# Patient Record
Sex: FEMALE | Race: OTHER | Employment: FULL TIME | URBAN - METROPOLITAN AREA
[De-identification: names, ages, dates, MRNs, and addresses within clinical notes are randomized per-mention and may not be internally consistent; named-entity substitution may affect disease eponyms.]

---

## 2023-01-12 ENCOUNTER — OFFICE VISIT (OUTPATIENT)
Dept: PODIATRY | Age: 33
End: 2023-01-12
Payer: COMMERCIAL

## 2023-01-12 VITALS — BODY MASS INDEX: 21.18 KG/M2 | HEIGHT: 69 IN | WEIGHT: 143 LBS

## 2023-01-12 DIAGNOSIS — D23.72 BENIGN NEOPLASM OF SKIN OF LOWER LIMB, INCLUDING HIP, LEFT: Primary | ICD-10-CM

## 2023-01-12 DIAGNOSIS — M79.672 LEFT FOOT PAIN: ICD-10-CM

## 2023-01-12 DIAGNOSIS — M20.5X2 ACQUIRED DIGITI QUINTI VARUS DEFORMITY OF LEFT FOOT: ICD-10-CM

## 2023-01-12 PROCEDURE — 17110 DESTRUCTION B9 LES UP TO 14: CPT | Performed by: PODIATRIST

## 2023-01-12 PROCEDURE — 99204 OFFICE O/P NEW MOD 45 MIN: CPT | Performed by: PODIATRIST

## 2023-01-12 RX ORDER — NORETHINDRONE ACETATE AND ETHINYL ESTRADIOL AND FERROUS FUMARATE 1MG-20(21)
KIT ORAL
COMMUNITY
Start: 2023-01-09

## 2023-01-12 RX ORDER — AMITRIPTYLINE HYDROCHLORIDE 10 MG/1
TABLET, FILM COATED ORAL NIGHTLY
COMMUNITY

## 2023-01-12 NOTE — PROGRESS NOTES
[address] [facility],[address] PODIATRY [facility]  [address]  [address]  Dept: [phone]  Dept Fax: [phone]    NEW PATIENT PROGRESS NOTE  Date of patient's visit: 1/12/2023  Patient's Name:  Denisa Uribe YOB: 1990            Patient Care Team:  Tylor Munguia as PCP - General (Nurse Practitioner)        Chief Complaint   Patient presents with    New Patient     To establish care    Toe Pain     Left 4th toe x1 year         HPI:   Denisa Uribe is a 34 y.o. female who presents to the office today complaining of left 4th toe pain. Symptoms began 1 year(s) ago. Patient relates pain is Present. Pain is rated 1 out of 10 and is described as intermittent, mild. Treatments prior to today's visit include: none today . Currently denies F/C/N/V. Pt's primary care physician is Sherrie Rolon last seen      Allergies   Allergen Reactions    Ciprofloxacin Hcl      Other reaction(s): muscle cramps    Amoxicillin Swelling       No past medical history on file. Prior to Admission medications    Medication Sig Start Date End Date Taking? Authorizing Provider   amitriptyline (ELAVIL) 10 MG tablet Take by mouth nightly   Yes Historical Provider, MD Malachi Angel FE 1/20 1-20 MG-MCG per tablet  1/9/23  Yes Historical Provider, MD   medroxyPROGESTERone (DEPO-PROVERA) 150 MG/ML injection Inject 150 mg into the muscle  Patient not taking: Reported on 1/12/2023    Historical Provider, MD       Past Surgical History:   Procedure Laterality Date    BREAST SURGERY      Breast Augmentation       Family History   Problem Relation Age of Onset    Heart Disease Father     Heart Disease Sister     Other Brother         suicide       Social History     Tobacco Use    Smoking status: Never    Smokeless tobacco: Not on file   Substance Use Topics    Alcohol use:  Yes     Alcohol/week: 1.0 standard drink     Types: 1 Standard drinks or equivalent per week Comment: occasional       Review of Systems    Review of Systems:   History obtained from chart review and the patient  General ROS: negative for - chills, fatigue, fever, night sweats or weight gain  Constitutional: Negative for chills, diaphoresis, fatigue, fever and unexpected weight change. Musculoskeletal: Positive for arthralgias, gait problem and joint swelling. Neurological ROS: negative for - behavioral changes, confusion, headaches or seizures. Negative for weakness and numbness. Dermatological ROS: negative for - mole changes, rash  Cardiovascular: Negative for leg swelling. Gastrointestinal: Negative for constipation, diarrhea, nausea and vomiting. Lower Extremity Physical Examination:   Vitals: There were no vitals filed for this visit. Dermatologic Exam:  Skin lesion present to the  left plantar foot with a central core and petchaie noted to the lesions periphery. Pain on palpation of the lesion      Musculoskeletal:     1st MPJ ROM decreased, Bilateral.  Muscle strength 5/5, Bilateral.  Pain present upon palpation of left 4th toe laterally as it abuts the 5th toe. .  Medial longitudinal arch, Bilateral WNL.   Ankle ROM WNL,Bilateral.    Dorsally contracted digits absent digits 1-5 Bilateral.     Vascular: DP and PT pulses palpable 2/4, Bilateral.  CFT <3 seconds, Bilateral.  Hair growth present to the level of the digits, Bilateral.  Edema absent, Bilateral.  Varicosities absent, Bilateral. Erythema absent, Bilateral    Neurological: Sensation intact to light touch to level of digits, Bilateral.  Protective sensation intact 10/10 sites via 5.07/10g [place]-Ronaldo Monofilament, Bilateral.  negative Tinel's, Bilateral.  negative Valleix sign, Bilateral.      Integument: Warm, dry, supple, Bilateral.  Open lesion absent, Bilateral.  Interdigital maceration absent to web spaces 1-4, Bilateral.  Nails are normal in length, thickness and color 1-5 bilateral.  Fissures absent, Bilateral.       Radiographs:  3 views left foot x ray: no fracture or discloation. No bony abnormality or bony exostosis. Asessment: Patient is a 34 y.o. female with:    Diagnosis Orders   1. 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 Left foot pain  XR FOOT LEFT (MIN 3 VIEWS)    12984 - AL DESTRUCTION BENIGN LESIONS UP TO 14      3. 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 Current condition and treatment options discussed in detail. Discussed conservative and surgical options with the patient. Advised pt to her treatment options. The lesions were partially excised via 15 blade and silver nitrate was applied under occlusion. The patient tolerated the procedure well and without complication. Advised patient to use vasoline to the area after tomorrow to prevent surrounding tissue irritation. I had a long discussion today with the patient about the likely diagnosis and its natural history, physical exam and imaging findings, as well as treatment options. We discussed both surgical and nonsurgical treatments, including risks and benefits. From a nonoperative standpoint, we discussed toe spacers and wider toe boxed shoes. Surgically, we did discuss future derotational arthoplasty after failed conservative treatment . Pt wants to try the easier stuff first.   .  Verbal and written instructions given to patient. Contact office with any questions/problems/concerns. RTC in 5week(s). All labs were reviewed and all imagining including the above findings were reviewed PRIOR to the patients arrival and with the patient today. Previous patient encounter was reviewed. Encounters from the patients other medical providers were reviewed and noted. Time was spent educating the patient and their families/caregivers on proper care of the feet and ankles.   All the above diagnosis were addressed at todays visit and all questions were answered.   A total of 45 minutes was spent with this patients encounter which included charting after the patients visit    1/12/2023    Electronically signed by Jake Gaxiola DPM on 1/12/2023 at 4:03 PM  1/12/2023

## 2023-02-16 ENCOUNTER — OFFICE VISIT (OUTPATIENT)
Dept: PODIATRY | Age: 33
End: 2023-02-16
Payer: COMMERCIAL

## 2023-02-16 DIAGNOSIS — D23.72 BENIGN NEOPLASM OF SKIN OF LOWER LIMB, INCLUDING HIP, LEFT: ICD-10-CM

## 2023-02-16 DIAGNOSIS — M79.672 LEFT FOOT PAIN: Primary | ICD-10-CM

## 2023-02-16 DIAGNOSIS — M20.5X2 ACQUIRED DIGITI QUINTI VARUS DEFORMITY OF LEFT FOOT: ICD-10-CM

## 2023-02-16 PROCEDURE — 17110 DESTRUCTION B9 LES UP TO 14: CPT | Performed by: PODIATRIST

## 2023-02-16 PROCEDURE — 99999 PR OFFICE/OUTPT VISIT,PROCEDURE ONLY: CPT | Performed by: PODIATRIST

## 2023-02-23 NOTE — PROGRESS NOTES
[address] [facility],[address] PODIATRY [facility]  [address]  [address]  Dept: [phone]  Dept Fax: [phone]    RETURN PATIENT PROGRESS NOTE  Date of patient's visit: 2/16/2023  Patient's Name:  Shahida Jeffrey YOB: 1990            Patient Care Team:  Meg Mcbride as PCP - General (Nurse Practitioner)  Xavier Valdes, DPM as Physician (Podiatry)       Shahida Jeffrey 34 y.o. female that presents for follow-up of   Chief Complaint   Patient presents with    Foot Pain     5 week left foot pain     Toe Pain     Left 4th toe     Pt's primary care physician is Adriane Avila last seen1/16/2023  Symptoms began 6 week(s) ago and are decreased . Patient relates pain is Present. Pain is rated 1 out of 10 and is described as mild. Treatments prior to today's visit include: previous acid treatemt . Currently denies F/C/N/V. Allergies   Allergen Reactions    Omeprazole Diarrhea, Headaches and Nausea And Vomiting     Patient requested to have Omeprazole (Prilosec) added to allergy list 2/15/23    Ciprofloxacin Hcl      Other reaction(s): muscle cramps    Amoxicillin Swelling       No past medical history on file. Prior to Admission medications    Medication Sig Start Date End Date Taking? Authorizing Provider   amitriptyline (ELAVIL) 10 MG tablet Take by mouth nightly   Yes Historical Provider, MD Bangura Brought FE 1/20 1-20 MG-MCG per tablet  1/9/23  Yes Historical Provider, MD   medroxyPROGESTERone (DEPO-PROVERA) 150 MG/ML injection Inject 150 mg into the muscle  Patient not taking: Reported on 2/16/2023    Historical Provider, MD       Review of Systems    Review of Systems:  History obtained from chart review and the patient  General ROS: negative for - chills, fatigue, fever, night sweats or weight gain  Constitutional: Negative for chills, diaphoresis, fatigue, fever and unexpected weight change.   Musculoskeletal: Positive for arthralgias, gait problem and joint swelling.  Neurological ROS: negative for - behavioral changes, confusion, headaches or seizures. Negative for weakness and numbness.   Dermatological ROS: negative for - mole changes, rash  Cardiovascular: Negative for leg swelling.   Gastrointestinal: Negative for constipation, diarrhea, nausea and vomiting.         Lower Extremity Physical Examination:     Vitals: There were no vitals filed for this visit.  General: AAO x 3 in NAD.   General: AAO x 3 in NAD.   Dermatologic Exam:  Skin lesion present to the right and left plantar foot with a central core and petchaie noted to the lesions periphery.  Pain on palpation of the lesion    Skin is thin, with flaky sloughing skin as well as decreased hair growth to the lower leg  Small red hemosiderin deposits seen dorsal foot   Musculoskeletal:     1st MPJ ROM WNL, Bilateral.  Muscle strength 5/5, Bilateral.  Pain present upon palpation of soft tissue lesions ming , Bilateral. decreased medial longitudinal arch, Bilateral.  Ankle ROM decreased,Bilateral.    Dorsally contracted digits present digits 2, Bilateral.     Vascular: DP pulses 1/4 bilateral.  PT pulses 0/4 bilateral.   CFT <5 seconds, Bilateral.  Hair growth absent to the level of the digits, Bilateral.  Edema present, Bilateral.  Varicosities absent, Bilateral. Erythema absent, Bilateral    Neurological: Sensation diminshed to light touch to level of digits, Bilateral.  Protective sensation intact 6/10 sites via 5.07/10g [place]-Ronaldo Monofilament, Bilateral.  negative Tinel's, Bilateral.  negative Valleix sign, Bilateral.      Integument: Warm, dry, supple, Bilateral.  Open lesion absent, Bilateral.  Interdigital maceration absent to web spaces 4, Bilateral.    Fissures absent, Bilateral.     Asessment: Patient is a 32 y.o. female with:    Diagnosis Orders   1. Left foot pain        2. 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 Benign neoplasm of skin  of lower limb, including hip, left              Plan: Patient examined and evaluated. Current condition and treatment options discussed in detail. Advised pt to her conditon    The lesions were partially excised via 15 blade and silver nitrate was applied under occlusion. The patient tolerated the procedure well and without complication. Advised patient to use vasoline to the area after tomorrow to prevent surrounding tissue irritation. .  Verbal and written instructions given to patient. Contact office with any questions/problems/concerns. No orders of the defined types were placed in this encounter. No orders of the defined types were placed in this encounter.        RTC in PRN.    2/16/2023      Electronically signed by Stephie Danielle DPM on 2/23/2023 at 3:11 PM  2/16/2023